# Patient Record
Sex: FEMALE
[De-identification: names, ages, dates, MRNs, and addresses within clinical notes are randomized per-mention and may not be internally consistent; named-entity substitution may affect disease eponyms.]

---

## 2018-02-08 PROBLEM — Z00.00 ENCOUNTER FOR PREVENTIVE HEALTH EXAMINATION: Status: ACTIVE | Noted: 2018-02-08

## 2020-12-29 ENCOUNTER — APPOINTMENT (OUTPATIENT)
Dept: OBGYN | Facility: CLINIC | Age: 50
End: 2020-12-29
Payer: COMMERCIAL

## 2020-12-29 VITALS
SYSTOLIC BLOOD PRESSURE: 120 MMHG | WEIGHT: 174 LBS | BODY MASS INDEX: 32.02 KG/M2 | HEIGHT: 62 IN | DIASTOLIC BLOOD PRESSURE: 90 MMHG

## 2020-12-29 DIAGNOSIS — G43.909 MIGRAINE, UNSPECIFIED, NOT INTRACTABLE, W/OUT STATUS MIGRAINOSUS: ICD-10-CM

## 2020-12-29 DIAGNOSIS — Z01.419 ENCOUNTER FOR GYNECOLOGICAL EXAMINATION (GENERAL) (ROUTINE) W/OUT ABNORMAL FINDINGS: ICD-10-CM

## 2020-12-29 DIAGNOSIS — Z78.9 OTHER SPECIFIED HEALTH STATUS: ICD-10-CM

## 2020-12-29 DIAGNOSIS — Z86.59 PERSONAL HISTORY OF OTHER MENTAL AND BEHAVIORAL DISORDERS: ICD-10-CM

## 2020-12-29 PROCEDURE — 99386 PREV VISIT NEW AGE 40-64: CPT

## 2020-12-29 PROCEDURE — 99072 ADDL SUPL MATRL&STAF TM PHE: CPT

## 2020-12-29 RX ORDER — LEVONORGESTREL AND ETHINYL ESTRADIOL 90; 20 UG/1; UG/1
90-20 TABLET ORAL
Qty: 84 | Refills: 0 | Status: COMPLETED | COMMUNITY
Start: 2020-02-13

## 2020-12-29 RX ORDER — MELOXICAM 15 MG/1
15 TABLET ORAL
Qty: 90 | Refills: 0 | Status: COMPLETED | COMMUNITY
Start: 2020-08-17

## 2020-12-29 RX ORDER — ALPRAZOLAM 1 MG/1
1 TABLET ORAL
Qty: 120 | Refills: 0 | Status: ACTIVE | COMMUNITY
Start: 2020-12-01

## 2020-12-29 RX ORDER — DICYCLOMINE HYDROCHLORIDE 10 MG/1
10 CAPSULE ORAL
Qty: 90 | Refills: 0 | Status: COMPLETED | COMMUNITY
Start: 2020-04-22

## 2020-12-29 RX ORDER — SULFAMETHOXAZOLE AND TRIMETHOPRIM 800; 160 MG/1; MG/1
800-160 TABLET ORAL
Qty: 10 | Refills: 0 | Status: COMPLETED | COMMUNITY
Start: 2020-11-03

## 2020-12-29 RX ORDER — METHYLPREDNISOLONE 4 MG/1
4 TABLET ORAL
Qty: 21 | Refills: 0 | Status: COMPLETED | COMMUNITY
Start: 2020-08-17

## 2020-12-29 RX ORDER — ALBUTEROL SULFATE 2.5 MG/3ML
(2.5 MG/3ML) SOLUTION RESPIRATORY (INHALATION)
Qty: 180 | Refills: 0 | Status: COMPLETED | COMMUNITY
Start: 2020-07-16

## 2020-12-29 RX ORDER — PHENAZOPYRIDINE HYDROCHLORIDE 200 MG/1
200 TABLET ORAL
Qty: 6 | Refills: 0 | Status: COMPLETED | COMMUNITY
Start: 2020-11-03

## 2020-12-29 RX ORDER — CYCLOBENZAPRINE HYDROCHLORIDE 10 MG/1
10 TABLET, FILM COATED ORAL
Qty: 30 | Refills: 0 | Status: COMPLETED | COMMUNITY
Start: 2020-11-17

## 2020-12-30 LAB — HPV HIGH+LOW RISK DNA PNL CVX: NOT DETECTED

## 2020-12-31 PROBLEM — Z01.419 WELL WOMAN EXAM WITH ROUTINE GYNECOLOGICAL EXAM: Status: ACTIVE | Noted: 2020-12-31

## 2020-12-31 PROBLEM — Z78.9 NO HISTORY OF ALCOHOL USE: Status: ACTIVE | Noted: 2020-12-31

## 2020-12-31 PROBLEM — G43.909 MIGRAINES: Status: RESOLVED | Noted: 2020-12-31 | Resolved: 2020-12-31

## 2020-12-31 PROBLEM — Z86.59 HISTORY OF DEPRESSION: Status: RESOLVED | Noted: 2020-12-31 | Resolved: 2020-12-31

## 2020-12-31 PROBLEM — Z78.9 NON-SMOKER: Status: ACTIVE | Noted: 2020-12-31

## 2020-12-31 PROBLEM — Z78.9 DOES NOT USE ILLICIT DRUGS: Status: ACTIVE | Noted: 2020-12-31

## 2020-12-31 NOTE — REVIEW OF SYSTEMS
[Headache] : headache [Anxiety] : anxiety [Depression] : depression [Hot Flashes] : hot flashes [Negative] : Heme/Lymph

## 2020-12-31 NOTE — PLAN
[FreeTextEntry1] : Reviewed risks of ocp.  Reviewed need to stop with migraines with neuro symptoms or aura.  Reviewed cancer risks.  Will also double pristiq to 100 er daily for hot flashes.\par \par Mammo/sono yearly.\par Colonoscopy 2018

## 2020-12-31 NOTE — HISTORY OF PRESENT ILLNESS
[FreeTextEntry1] : Check up.   Feels well.  Desires to continue continuous ocp for regulation of menses.  Still has hot flashes.  Migraines without neuro symptoms.  Refuses sti.  Weight stable.  Socially distancing.

## 2021-01-04 LAB — CYTOLOGY CVX/VAG DOC THIN PREP: NORMAL

## 2021-01-26 ENCOUNTER — APPOINTMENT (OUTPATIENT)
Dept: OBGYN | Facility: CLINIC | Age: 51
End: 2021-01-26
Payer: COMMERCIAL

## 2021-01-26 VITALS
DIASTOLIC BLOOD PRESSURE: 64 MMHG | HEIGHT: 62 IN | BODY MASS INDEX: 32.2 KG/M2 | WEIGHT: 175 LBS | SYSTOLIC BLOOD PRESSURE: 112 MMHG

## 2021-01-26 DIAGNOSIS — N95.1 MENOPAUSAL AND FEMALE CLIMACTERIC STATES: ICD-10-CM

## 2021-01-26 PROCEDURE — 99072 ADDL SUPL MATRL&STAF TM PHE: CPT

## 2021-01-26 PROCEDURE — 99213 OFFICE O/P EST LOW 20 MIN: CPT

## 2021-01-26 RX ORDER — TOPIRAMATE 100 MG/1
100 TABLET, FILM COATED ORAL
Qty: 90 | Refills: 0 | Status: DISCONTINUED | COMMUNITY
Start: 2020-11-17 | End: 2021-01-26

## 2021-01-26 RX ORDER — DESVENLAFAXINE 100 MG/1
100 TABLET, EXTENDED RELEASE ORAL
Qty: 30 | Refills: 5 | Status: ACTIVE | COMMUNITY
Start: 2020-11-24

## 2021-01-26 RX ORDER — LEVONORGESTREL AND ETHINYL ESTRADIOL 0.15-0.03
0.15-0.03 KIT ORAL DAILY
Qty: 91 | Refills: 1 | Status: ACTIVE | COMMUNITY
Start: 2020-12-29 | End: 1900-01-01

## 2021-01-26 NOTE — PLAN
[FreeTextEntry1] : Discussed risks of meds and options for hot flashes. \par Mammo scheduled for this Friday.

## 2021-06-30 ENCOUNTER — RX RENEWAL (OUTPATIENT)
Age: 51
End: 2021-06-30

## 2021-09-14 ENCOUNTER — NON-APPOINTMENT (OUTPATIENT)
Age: 51
End: 2021-09-14

## 2021-09-14 ENCOUNTER — APPOINTMENT (OUTPATIENT)
Dept: OBGYN | Facility: CLINIC | Age: 51
End: 2021-09-14
Payer: COMMERCIAL

## 2021-09-14 VITALS
SYSTOLIC BLOOD PRESSURE: 120 MMHG | DIASTOLIC BLOOD PRESSURE: 64 MMHG | HEIGHT: 62 IN | WEIGHT: 187 LBS | BODY MASS INDEX: 34.41 KG/M2

## 2021-09-14 DIAGNOSIS — N93.9 ABNORMAL UTERINE AND VAGINAL BLEEDING, UNSPECIFIED: ICD-10-CM

## 2021-09-14 PROCEDURE — 99213 OFFICE O/P EST LOW 20 MIN: CPT

## 2021-09-14 NOTE — PLAN
[FreeTextEntry1] : Referral provided for transabdominal/transvaginal sonogram\par Follow up at results received

## 2021-09-14 NOTE — HISTORY OF PRESENT ILLNESS
[N] : Patient reports normal menses [FreeTextEntry1] : 50 yo presents with concern of abnormal uterine bleeding x 1 day.  She report yesterday she was experiencing brown/black spotting when she wiped. Today, bleeding is heavier with clots accompanied with migraine, cramps and nausea.  Levonorgestrel/ethinyl/estradiol without incidence. [PapSmeardate] : 12/2020 [LMPDate] : 17 years ago

## 2021-09-14 NOTE — REVIEW OF SYSTEMS
[Abdominal Pain] : abdominal pain [Abn Vaginal bleeding] : abnormal vaginal bleeding [Headache] : headache [Negative] : Heme/Lymph

## 2021-09-17 ENCOUNTER — NON-APPOINTMENT (OUTPATIENT)
Age: 51
End: 2021-09-17

## 2021-09-28 ENCOUNTER — APPOINTMENT (OUTPATIENT)
Dept: OBGYN | Facility: CLINIC | Age: 51
End: 2021-09-28